# Patient Record
Sex: MALE | Race: WHITE
[De-identification: names, ages, dates, MRNs, and addresses within clinical notes are randomized per-mention and may not be internally consistent; named-entity substitution may affect disease eponyms.]

---

## 2022-05-03 PROBLEM — Z00.00 ENCOUNTER FOR PREVENTIVE HEALTH EXAMINATION: Status: ACTIVE | Noted: 2022-05-03

## 2022-05-06 ENCOUNTER — APPOINTMENT (OUTPATIENT)
Dept: PULMONOLOGY | Facility: CLINIC | Age: 72
End: 2022-05-06
Payer: MEDICARE

## 2022-05-06 VITALS
HEART RATE: 64 BPM | DIASTOLIC BLOOD PRESSURE: 74 MMHG | WEIGHT: 180 LBS | BODY MASS INDEX: 27.28 KG/M2 | SYSTOLIC BLOOD PRESSURE: 130 MMHG | TEMPERATURE: 97.4 F | HEIGHT: 68 IN | OXYGEN SATURATION: 96 %

## 2022-05-06 DIAGNOSIS — Z87.01 PERSONAL HISTORY OF PNEUMONIA (RECURRENT): ICD-10-CM

## 2022-05-06 DIAGNOSIS — Z78.9 OTHER SPECIFIED HEALTH STATUS: ICD-10-CM

## 2022-05-06 PROCEDURE — 94010 BREATHING CAPACITY TEST: CPT

## 2022-05-06 PROCEDURE — 99203 OFFICE O/P NEW LOW 30 MIN: CPT | Mod: 25

## 2022-05-07 PROBLEM — Z87.01 HISTORY OF PNEUMONIA: Status: RESOLVED | Noted: 2022-05-07 | Resolved: 2022-05-07

## 2022-05-07 PROBLEM — Z78.9 NON-SMOKER: Status: ACTIVE | Noted: 2022-05-07

## 2022-05-07 NOTE — PROCEDURE
[FreeTextEntry1] :  radiologic findings are not indicative for pathologic disease unless it is the correct clinical scenario\par \par mckenna is s atypical beingin fashion

## 2022-05-07 NOTE — HISTORY OF PRESENT ILLNESS
[TextBox_4] : pneumonia back in march,  at [facility], but no hypoxemia-\par \par non smoker\par \par bad pneuonia in the past\par \par here with pleural thickening\par \par feels well\par \par there are inflamamtory findings that look chronic most likley [facility] they thought this were acute pneuonias\par \par it is clear that none of these films were read correctly\par \par the latter radiologist should indicate that this is common benign finding\par \par \par

## 2022-05-07 NOTE — DISCUSSION/SUMMARY
[FreeTextEntry1] : i explained to the patient that this is a benign finding,\par \par days gone by it was not.\par \par \par

## 2022-06-14 ENCOUNTER — NON-APPOINTMENT (OUTPATIENT)
Age: 72
End: 2022-06-14

## 2022-06-14 ENCOUNTER — APPOINTMENT (OUTPATIENT)
Dept: HEART AND VASCULAR | Facility: CLINIC | Age: 72
End: 2022-06-14

## 2022-06-14 ENCOUNTER — APPOINTMENT (OUTPATIENT)
Dept: HEART AND VASCULAR | Facility: CLINIC | Age: 72
End: 2022-06-14
Payer: MEDICARE

## 2022-06-14 VITALS
WEIGHT: 183.38 LBS | OXYGEN SATURATION: 96 % | HEIGHT: 68 IN | HEART RATE: 62 BPM | DIASTOLIC BLOOD PRESSURE: 75 MMHG | SYSTOLIC BLOOD PRESSURE: 136 MMHG | TEMPERATURE: 97.2 F | BODY MASS INDEX: 27.79 KG/M2

## 2022-06-14 VITALS — DIASTOLIC BLOOD PRESSURE: 78 MMHG | SYSTOLIC BLOOD PRESSURE: 147 MMHG

## 2022-06-14 PROCEDURE — 99205 OFFICE O/P NEW HI 60 MIN: CPT

## 2022-06-14 PROCEDURE — 93306 TTE W/DOPPLER COMPLETE: CPT

## 2022-06-14 PROCEDURE — 93000 ELECTROCARDIOGRAM COMPLETE: CPT

## 2022-06-14 NOTE — HISTORY OF PRESENT ILLNESS
[FreeTextEntry1] : 72 man with a history of hyperlipidemia recently noted to have dilated aorta on CXR for which he was referred.  \par \par He reports that he had COVID 3/2020 and subsequent PNA. Although he was quite ill, he was never hospitalized. He had a cough that he saw Dr. Velázquez for and now much improved. \par \par Lifestyle History:\par Mediterranean Diet Score (9 question survey) was 8. \par (8-9: optimal, 6-7: near-optimal, 4-5: suboptimal, 0-3: markedly suboptimal)\par Exercise: Patient reports exercising at a moderate level for >150 minutes per week of light exercise. \par Smoking: Never smoker. \par Stress: Patient denies any major acute stresses, but had some mild stresses on his way to the office. \par  \par No active symptoms even with extensive walking. \par \par 2021- dilated aorta\par \par mother-  of "old age"\par father-  at 56 , brain tumor, heavy smoker\par sisters- two in their 70s and a live and well. \par children- 4 alive and well \par \par PSH- gallbladder  \par 2021- chol - 169, HDL 44, trig 68, , A1C 5.5

## 2022-06-14 NOTE — DISCUSSION/SUMMARY
[FreeTextEntry1] : 73 yo man with a history of hyperlipidemia recently noted to have dilated aorta on CXR for which he was referred.  \par \par Aortic Dilitation- Ascending aorta measures 3.8 cm on echo and could not visualize full aorta. MRA ordered to better assess. \par \par Elevated blood pressure reading- Blood pressure was notably elevated today, but he feels in the setting of increased salt and argument on the way to the office. He will work on lifestyle and asks that we monitor since has not been elevated to this extent in the past. He will repeat with urologist in a few weeks and Dr. Grimm. Return in August for follow-up. \par \par Hyperlipidemia- still above goal only on recent labs. Will repeat after lifestyle change. May consider carotid Doppler or CAC in the future to better guide aggressiveness of therapy. \par \par \par

## 2022-08-12 ENCOUNTER — APPOINTMENT (OUTPATIENT)
Dept: HEART AND VASCULAR | Facility: CLINIC | Age: 72
End: 2022-08-12

## 2022-08-12 ENCOUNTER — NON-APPOINTMENT (OUTPATIENT)
Age: 72
End: 2022-08-12

## 2022-08-12 VITALS
OXYGEN SATURATION: 96 % | TEMPERATURE: 98.6 F | WEIGHT: 180 LBS | HEART RATE: 56 BPM | BODY MASS INDEX: 27.28 KG/M2 | DIASTOLIC BLOOD PRESSURE: 80 MMHG | SYSTOLIC BLOOD PRESSURE: 140 MMHG | HEIGHT: 68 IN

## 2022-08-12 VITALS — DIASTOLIC BLOOD PRESSURE: 70 MMHG | SYSTOLIC BLOOD PRESSURE: 120 MMHG

## 2022-08-12 DIAGNOSIS — R03.0 ELEVATED BLOOD-PRESSURE READING, W/OUT DIAGNOSIS OF HYPERTENSION: ICD-10-CM

## 2022-08-12 DIAGNOSIS — E78.00 PURE HYPERCHOLESTEROLEMIA, UNSPECIFIED: ICD-10-CM

## 2022-08-12 DIAGNOSIS — I77.810 THORACIC AORTIC ECTASIA: ICD-10-CM

## 2022-08-12 DIAGNOSIS — E78.5 HYPERLIPIDEMIA, UNSPECIFIED: ICD-10-CM

## 2022-08-12 PROCEDURE — 99214 OFFICE O/P EST MOD 30 MIN: CPT

## 2022-08-12 PROCEDURE — 93000 ELECTROCARDIOGRAM COMPLETE: CPT

## 2022-08-12 NOTE — DISCUSSION/SUMMARY
[FreeTextEntry1] : 72 man with a history of hyperlipidemia recently noted to have dilated aorta on CXR for which he was referred.  \par \par Aortic Dilitation- Ascending aorta measures 3.8 cm on echo. Since only borderline dilated, will check echo in one year (June 2023) and consider further imaging if significantly dilated. \par \par Elevated blood pressure reading- Blood pressure was borderline elevated, but improved on repeat.  He will work on lifestyle by increasing walking to 3-4 miles daily and some at a fast pace. He'll also try to avoid significantly salty foods. No need for medical therapy at this time. \par \par Hyperlipidemia- Will get labs from Dr. Grimm in September to determine whether at goal.  May consider carotid Doppler or CAC in the future to better guide aggressiveness of therapy. \par \par Return in 6 months. Echo in 1 year. \par \par

## 2022-08-12 NOTE — HISTORY OF PRESENT ILLNESS
[FreeTextEntry1] : 72 man with a history of hyperlipidemia recently noted to have dilated aorta on CXR for which he was referred.  \par \par He has been walking ~1-2 miles daily. He eats very healthy and denies junk food. He feels that he may have more salt than he should. Foods contain salt, does add salt. \par \par He gets his blood work done with Dr. Grimm and is due for September. \par \par 2021- dilated aorta\par \par mother-  of "old age"\par father-  at 56 , brain tumor, heavy smoker\par sisters- two in their 70s and a live and well. \par children- 4 alive and well \par \par PSH- gallbladder  \par 2021- chol - 169, HDL 44, trig 68, , A1C 5.5 \par \par Echo- 6/15/2022- nl LV & RV size and function, ascending aorta dilitation 3.8 cm \par \par COVID- 3/2020

## 2022-09-23 ENCOUNTER — APPOINTMENT (OUTPATIENT)
Dept: PULMONOLOGY | Facility: CLINIC | Age: 72
End: 2022-09-23

## 2022-09-23 VITALS
WEIGHT: 180 LBS | DIASTOLIC BLOOD PRESSURE: 77 MMHG | HEIGHT: 68 IN | SYSTOLIC BLOOD PRESSURE: 130 MMHG | HEART RATE: 69 BPM | BODY MASS INDEX: 27.28 KG/M2 | OXYGEN SATURATION: 96 % | TEMPERATURE: 97.9 F

## 2022-09-23 DIAGNOSIS — R05.3 CHRONIC COUGH: ICD-10-CM

## 2022-09-23 PROCEDURE — 99213 OFFICE O/P EST LOW 20 MIN: CPT | Mod: 25

## 2022-09-23 PROCEDURE — 94010 BREATHING CAPACITY TEST: CPT

## 2022-09-23 RX ORDER — PREDNISONE 20 MG/1
20 TABLET ORAL
Qty: 10 | Refills: 3 | Status: ACTIVE | COMMUNITY
Start: 2022-09-23 | End: 1900-01-01

## 2022-09-26 NOTE — HISTORY OF PRESENT ILLNESS
[TextBox_4] : white phlegm in the morning for a month\par \par quick spell and then it goes away\par \par and ice cream sorbet\par \par sometimes a cold jamar.\par \par anything starch.\par \par clear \par \par nose lookes fine\par \par inhaler not help, monteukast\par \par flonase.\par \par clear mucous\par \par no antihistamine\par \par

## 2022-09-26 NOTE — REVIEW OF SYSTEMS
[Cough] : cough [Sputum] : sputum [Dyspnea] : no dyspnea [Negative] : Endocrine [TextBox_30] : clear mucous

## 2022-09-26 NOTE — DISCUSSION/SUMMARY
[FreeTextEntry1] : i have no idea why he is coughing up clear mucous for a month\par \par will give short course of prednisone empricially

## 2025-05-08 ENCOUNTER — APPOINTMENT (OUTPATIENT)
Dept: HEART AND VASCULAR | Facility: CLINIC | Age: 75
End: 2025-05-08
Payer: MEDICARE

## 2025-05-08 ENCOUNTER — NON-APPOINTMENT (OUTPATIENT)
Age: 75
End: 2025-05-08

## 2025-05-08 VITALS
DIASTOLIC BLOOD PRESSURE: 56 MMHG | BODY MASS INDEX: 26.67 KG/M2 | TEMPERATURE: 98.1 F | OXYGEN SATURATION: 96 % | HEART RATE: 62 BPM | SYSTOLIC BLOOD PRESSURE: 119 MMHG | WEIGHT: 176 LBS | HEIGHT: 68 IN

## 2025-05-08 DIAGNOSIS — I45.10 UNSPECIFIED RIGHT BUNDLE-BRANCH BLOCK: ICD-10-CM

## 2025-05-08 DIAGNOSIS — I77.810 THORACIC AORTIC ECTASIA: ICD-10-CM

## 2025-05-08 DIAGNOSIS — E78.5 HYPERLIPIDEMIA, UNSPECIFIED: ICD-10-CM

## 2025-05-08 PROCEDURE — 93000 ELECTROCARDIOGRAM COMPLETE: CPT

## 2025-05-08 PROCEDURE — 99214 OFFICE O/P EST MOD 30 MIN: CPT

## 2025-05-08 RX ORDER — ALPRAZOLAM 2 MG/1
TABLET ORAL
Refills: 0 | Status: ACTIVE | COMMUNITY

## 2025-05-08 RX ORDER — ATORVASTATIN CALCIUM 20 MG/1
20 TABLET, FILM COATED ORAL
Refills: 0 | Status: ACTIVE | COMMUNITY